# Patient Record
Sex: FEMALE | Race: WHITE | NOT HISPANIC OR LATINO | Employment: OTHER | URBAN - METROPOLITAN AREA
[De-identification: names, ages, dates, MRNs, and addresses within clinical notes are randomized per-mention and may not be internally consistent; named-entity substitution may affect disease eponyms.]

---

## 2024-04-02 ENCOUNTER — TRANSFERRED RECORDS (OUTPATIENT)
Dept: HEALTH INFORMATION MANAGEMENT | Facility: CLINIC | Age: 74
End: 2024-04-02

## 2024-06-05 ENCOUNTER — APPOINTMENT (OUTPATIENT)
Dept: RADIOLOGY | Facility: CLINIC | Age: 74
End: 2024-06-05
Attending: EMERGENCY MEDICINE
Payer: COMMERCIAL

## 2024-06-05 ENCOUNTER — HOSPITAL ENCOUNTER (EMERGENCY)
Facility: CLINIC | Age: 74
Discharge: HOME | End: 2024-06-05
Attending: EMERGENCY MEDICINE | Admitting: EMERGENCY MEDICINE
Payer: COMMERCIAL

## 2024-06-05 ENCOUNTER — APPOINTMENT (OUTPATIENT)
Dept: CT IMAGING | Facility: CLINIC | Age: 74
End: 2024-06-05
Attending: EMERGENCY MEDICINE
Payer: COMMERCIAL

## 2024-06-05 VITALS
OXYGEN SATURATION: 95 % | SYSTOLIC BLOOD PRESSURE: 163 MMHG | HEIGHT: 65 IN | RESPIRATION RATE: 18 BRPM | TEMPERATURE: 98.3 F | BODY MASS INDEX: 16.5 KG/M2 | WEIGHT: 99 LBS | DIASTOLIC BLOOD PRESSURE: 65 MMHG | HEART RATE: 52 BPM

## 2024-06-05 DIAGNOSIS — R07.89 ATYPICAL CHEST PAIN: ICD-10-CM

## 2024-06-05 LAB
ANION GAP SERPL CALCULATED.3IONS-SCNC: 9 MMOL/L (ref 7–15)
ATRIAL RATE - MUSE: 54 BPM
BASE EXCESS BLDV CALC-SCNC: 8.2 MMOL/L (ref -3–3)
BASOPHILS # BLD AUTO: 0 10E3/UL (ref 0–0.2)
BASOPHILS NFR BLD AUTO: 1 %
BUN SERPL-MCNC: 4.3 MG/DL (ref 8–23)
CALCIUM SERPL-MCNC: 9.6 MG/DL (ref 8.8–10.2)
CHLORIDE SERPL-SCNC: 104 MMOL/L (ref 98–107)
CREAT SERPL-MCNC: 0.58 MG/DL (ref 0.51–0.95)
DEPRECATED HCO3 PLAS-SCNC: 28 MMOL/L (ref 22–29)
DIASTOLIC BLOOD PRESSURE - MUSE: NORMAL MMHG
EGFRCR SERPLBLD CKD-EPI 2021: >90 ML/MIN/1.73M2
EOSINOPHIL # BLD AUTO: 0 10E3/UL (ref 0–0.7)
EOSINOPHIL NFR BLD AUTO: 0 %
ERYTHROCYTE [DISTWIDTH] IN BLOOD BY AUTOMATED COUNT: 15.2 % (ref 10–15)
FLUAV RNA SPEC QL NAA+PROBE: NEGATIVE
FLUBV RNA RESP QL NAA+PROBE: NEGATIVE
GLUCOSE SERPL-MCNC: 89 MG/DL (ref 70–99)
HCO3 BLDV-SCNC: 31 MMOL/L (ref 21–28)
HCT VFR BLD AUTO: 40 % (ref 35–47)
HGB BLD-MCNC: 13.4 G/DL (ref 11.7–15.7)
IMM GRANULOCYTES # BLD: 0 10E3/UL
IMM GRANULOCYTES NFR BLD: 0 %
INTERPRETATION ECG - MUSE: NORMAL
LYMPHOCYTES # BLD AUTO: 2.4 10E3/UL (ref 0.8–5.3)
LYMPHOCYTES NFR BLD AUTO: 35 %
MCH RBC QN AUTO: 30.1 PG (ref 26.5–33)
MCHC RBC AUTO-ENTMCNC: 33.5 G/DL (ref 31.5–36.5)
MCV RBC AUTO: 90 FL (ref 78–100)
MONOCYTES # BLD AUTO: 0.3 10E3/UL (ref 0–1.3)
MONOCYTES NFR BLD AUTO: 5 %
NEUTROPHILS # BLD AUTO: 4 10E3/UL (ref 1.6–8.3)
NEUTROPHILS NFR BLD AUTO: 59 %
NRBC # BLD AUTO: 0 10E3/UL
NRBC BLD AUTO-RTO: 0 /100
NT-PROBNP SERPL-MCNC: 375 PG/ML (ref 0–900)
O2/TOTAL GAS SETTING VFR VENT: 21 %
OXYHGB MFR BLDV: 27 % (ref 70–75)
P AXIS - MUSE: 64 DEGREES
PCO2 BLDV: 40 MM HG (ref 40–50)
PH BLDV: 7.5 [PH] (ref 7.32–7.43)
PLATELET # BLD AUTO: 286 10E3/UL (ref 150–450)
PO2 BLDV: 15 MM HG (ref 25–47)
POTASSIUM SERPL-SCNC: 3.3 MMOL/L (ref 3.4–5.3)
PR INTERVAL - MUSE: 102 MS
QRS DURATION - MUSE: 74 MS
QT - MUSE: 508 MS
QTC - MUSE: 481 MS
R AXIS - MUSE: 64 DEGREES
RBC # BLD AUTO: 4.45 10E6/UL (ref 3.8–5.2)
RSV RNA SPEC NAA+PROBE: NEGATIVE
SAO2 % BLDV: 29.8 % (ref 70–75)
SARS-COV-2 RNA RESP QL NAA+PROBE: NEGATIVE
SODIUM SERPL-SCNC: 141 MMOL/L (ref 135–145)
SYSTOLIC BLOOD PRESSURE - MUSE: NORMAL MMHG
T AXIS - MUSE: 50 DEGREES
T4 FREE SERPL-MCNC: 1.12 NG/DL (ref 0.9–1.7)
TROPONIN T SERPL HS-MCNC: 8 NG/L
TSH SERPL DL<=0.005 MIU/L-ACNC: 4.8 UIU/ML (ref 0.3–4.2)
VENTRICULAR RATE- MUSE: 54 BPM
WBC # BLD AUTO: 6.8 10E3/UL (ref 4–11)

## 2024-06-05 PROCEDURE — 84443 ASSAY THYROID STIM HORMONE: CPT | Performed by: EMERGENCY MEDICINE

## 2024-06-05 PROCEDURE — 94640 AIRWAY INHALATION TREATMENT: CPT

## 2024-06-05 PROCEDURE — 71045 X-RAY EXAM CHEST 1 VIEW: CPT

## 2024-06-05 PROCEDURE — 250N000009 HC RX 250: Performed by: EMERGENCY MEDICINE

## 2024-06-05 PROCEDURE — 83880 ASSAY OF NATRIURETIC PEPTIDE: CPT | Performed by: EMERGENCY MEDICINE

## 2024-06-05 PROCEDURE — 87637 SARSCOV2&INF A&B&RSV AMP PRB: CPT | Performed by: EMERGENCY MEDICINE

## 2024-06-05 PROCEDURE — 36415 COLL VENOUS BLD VENIPUNCTURE: CPT | Performed by: EMERGENCY MEDICINE

## 2024-06-05 PROCEDURE — 85025 COMPLETE CBC W/AUTO DIFF WBC: CPT | Performed by: EMERGENCY MEDICINE

## 2024-06-05 PROCEDURE — 99285 EMERGENCY DEPT VISIT HI MDM: CPT | Mod: 25

## 2024-06-05 PROCEDURE — 93005 ELECTROCARDIOGRAM TRACING: CPT | Performed by: EMERGENCY MEDICINE

## 2024-06-05 PROCEDURE — 74177 CT ABD & PELVIS W/CONTRAST: CPT

## 2024-06-05 PROCEDURE — 82374 ASSAY BLOOD CARBON DIOXIDE: CPT | Performed by: EMERGENCY MEDICINE

## 2024-06-05 PROCEDURE — 250N000011 HC RX IP 250 OP 636: Performed by: EMERGENCY MEDICINE

## 2024-06-05 PROCEDURE — 250N000013 HC RX MED GY IP 250 OP 250 PS 637: Performed by: EMERGENCY MEDICINE

## 2024-06-05 PROCEDURE — 84484 ASSAY OF TROPONIN QUANT: CPT | Performed by: EMERGENCY MEDICINE

## 2024-06-05 PROCEDURE — 84439 ASSAY OF FREE THYROXINE: CPT | Performed by: EMERGENCY MEDICINE

## 2024-06-05 PROCEDURE — 82805 BLOOD GASES W/O2 SATURATION: CPT | Performed by: EMERGENCY MEDICINE

## 2024-06-05 PROCEDURE — 999N000157 HC STATISTIC RCP TIME EA 10 MIN

## 2024-06-05 RX ORDER — ZINC GLUCONATE 50 MG
50 TABLET ORAL DAILY
COMMUNITY

## 2024-06-05 RX ORDER — AMLODIPINE BESYLATE 5 MG/1
5 TABLET ORAL DAILY
COMMUNITY
Start: 2024-05-06 | End: 2025-05-06

## 2024-06-05 RX ORDER — LOSARTAN POTASSIUM 25 MG/1
1 TABLET ORAL DAILY
COMMUNITY
Start: 2024-03-11

## 2024-06-05 RX ORDER — IPRATROPIUM BROMIDE AND ALBUTEROL SULFATE 2.5; .5 MG/3ML; MG/3ML
3 SOLUTION RESPIRATORY (INHALATION) ONCE
Status: COMPLETED | OUTPATIENT
Start: 2024-06-05 | End: 2024-06-05

## 2024-06-05 RX ORDER — OXYCODONE HYDROCHLORIDE 5 MG/1
5 TABLET ORAL EVERY 8 HOURS PRN
COMMUNITY
Start: 2024-05-08

## 2024-06-05 RX ORDER — FOLIC ACID 1 MG/1
1 TABLET ORAL DAILY
COMMUNITY
Start: 2024-04-03 | End: 2025-04-03

## 2024-06-05 RX ORDER — OXYCODONE HYDROCHLORIDE 5 MG/1
5 TABLET ORAL ONCE
Status: COMPLETED | OUTPATIENT
Start: 2024-06-05 | End: 2024-06-05

## 2024-06-05 RX ORDER — MULTIVIT-MIN/IRON/FOLIC ACID/K 18-600-40
6000 CAPSULE ORAL DAILY
COMMUNITY

## 2024-06-05 RX ORDER — IOPAMIDOL 755 MG/ML
90 INJECTION, SOLUTION INTRAVASCULAR ONCE
Status: COMPLETED | OUTPATIENT
Start: 2024-06-05 | End: 2024-06-05

## 2024-06-05 RX ORDER — TRAZODONE HYDROCHLORIDE 50 MG/1
1-2 TABLET, FILM COATED ORAL AT BEDTIME
COMMUNITY
Start: 2024-04-03

## 2024-06-05 RX ADMIN — IOPAMIDOL 90 ML: 755 INJECTION, SOLUTION INTRAVENOUS at 16:24

## 2024-06-05 RX ADMIN — IPRATROPIUM BROMIDE AND ALBUTEROL SULFATE 3 ML: .5; 3 SOLUTION RESPIRATORY (INHALATION) at 15:07

## 2024-06-05 RX ADMIN — OXYCODONE 5 MG: 5 TABLET ORAL at 17:18

## 2024-06-05 ASSESSMENT — ACTIVITIES OF DAILY LIVING (ADL)
ADLS_ACUITY_SCORE: 35

## 2024-06-05 ASSESSMENT — COLUMBIA-SUICIDE SEVERITY RATING SCALE - C-SSRS
2. HAVE YOU ACTUALLY HAD ANY THOUGHTS OF KILLING YOURSELF IN THE PAST MONTH?: NO
6. HAVE YOU EVER DONE ANYTHING, STARTED TO DO ANYTHING, OR PREPARED TO DO ANYTHING TO END YOUR LIFE?: NO
1. IN THE PAST MONTH, HAVE YOU WISHED YOU WERE DEAD OR WISHED YOU COULD GO TO SLEEP AND NOT WAKE UP?: NO

## 2024-06-05 NOTE — ED TRIAGE NOTES
Pt arrives via EMS with cp and sob. Started last night around 0200. Hx of COPD. Denies using oxygen at home. Smells heavily of cigarette smoke. Reports cutting down to a pack a day recently.      Triage Assessment (Adult)       Row Name 06/05/24 1346          Triage Assessment    Airway WDL WDL        Respiratory WDL    Respiratory WDL rhythm/pattern     Rhythm/Pattern, Respiratory dyspnea upon exertion        Skin Circulation/Temperature WDL    Skin Circulation/Temperature WDL WDL        Cardiac WDL    Cardiac WDL chest pain        Chest Pain Assessment    Chest Pain Location epigastric     Alleviating Factors sitting up     Chest Pain Intervention 12-lead ECG obtained        Peripheral/Neurovascular WDL    Peripheral Neurovascular WDL WDL        Cognitive/Neuro/Behavioral WDL    Cognitive/Neuro/Behavioral WDL WDL

## 2024-06-05 NOTE — MEDICATION SCRIBE - ADMISSION MEDICATION HISTORY
Medication Scribe Admission Medication History    Admission medication history is complete. The information provided in this note is only as accurate as the sources available at the time of the update.    Information Source(s): Patient via in-person    Pertinent Information: PDMP reports oxycodone 5 mg was picked up on 5/8, #65 (22 day supply). Patient states no medication has been taken today, had everything yesterday at noon. Last oxycodone yesterday morning per pt.     Changes made to PTA medication list:  Added: Added all medications seen below. No meds listed PTA.  Deleted: None  Changed: None    Allergies reviewed with patient and updates made in EHR: yes    Medication History Completed By: Aryan Enriquez 6/5/2024 4:53 PM    PTA Med List   Medication Sig Last Dose    amLODIPine (NORVASC) 5 MG tablet Take 5 mg by mouth daily 6/4/2024    ascorbic acid (VITAMIN C) 500 MG CPCR CR capsule Take 500 mg by mouth daily 6/4/2024 at 1200    Cholecalciferol (VITAMIN D) 50 MCG (2000 UT) CAPS Take 6,000 Units by mouth daily 6/4/2024 at 1200    folic acid (FOLVITE) 1 MG tablet Take 1 tablet by mouth daily 6/4/2024 at 1200    losartan (COZAAR) 25 MG tablet Take 1 tablet by mouth daily 6/4/2024 at 1200    oxyCODONE (ROXICODONE) 5 MG tablet Take 5 mg by mouth every 8 hours as needed 6/4/2024 at AM, none today    traZODone (DESYREL) 50 MG tablet Take 1-2 tablets by mouth at bedtime 6/4/2024 at HS 75 mg    zinc gluconate 50 MG tablet Take 50 mg by mouth daily 6/4/2024 at 1200

## 2024-06-05 NOTE — ED PROVIDER NOTES
EMERGENCY DEPARTMENT ENCOUNTER      NAME: Cristel KELLY Head  AGE: 74 year old female  YOB: 1950  MRN: [id number]  EVALUATION DATE & TIME: 6/5/2024  1:49 PM    PCP: Tamika Manuel    ED PROVIDER: Balaji Liao M.D.      Chief Complaint   Patient presents with    Shortness of Breath    Chest Pain         FINAL IMPRESSION:  1. Atypical chest pain          ED COURSE & MEDICAL DECISION MAKING:    Pertinent Labs & Imaging studies reviewed. (See chart for details)  74 year old female presents to the Emergency Department for evaluation of chest pain, shortness of breath. Patient appears non toxic with stable vitals signs, patient is afebrile with no tachycardia or hypoxia.  Lung sounds are clear with no marked increased work of breathing.  Certainly concern for COPD exacerbation, her story is atypical for ACS, less likely pneumonia, no fever or bodyaches to suggest COVID or influenza.  Considered but lower suspicion for pneumothorax or rib fracture.  No tachycardia, pleurisy, hypoxia, nothing just PE or dissection.  Per review of the medical record, did review office visit through [facility] internal medicine on 5/13/2024 patient noted to have a history of severe hypokalemia, is on levothyroxine.  Considered but less likely endocrine dysfunction or Theresa abnormality.  Considered CT imaging of the chest but again no tachycardia or pleurisy to suggest PE.  We will obtain screening labs, ECG, troponin, blood gas and a chest x-ray.  Patient was given DuoNeb.      2:20 PM Met with and introduced myself to the patient. Discussed history and plan of care.   4:00 PM repeat exam is benign, patient states that she feels much improved.  5:23 PM repeat exam is benign, discussed findings and discharge close follow-up.    Reassessment: Labs by my independent interpretation showed no signs of acute kidney injury with a creatinine of 0.58, no signs of anemia with a hemoglobin of 13.4.  Troponin of 8 given  duration of symptoms certainly nothing suggest cardiac ischemia, ECG nonischemic.  Patient had some mild bradycardia here and did review office visit from [facility] on 5/13/2024 where pulse again noted at 56 which is essentially where she is at here today, at that encounter there was discussion for continued Synthroid, certainly her endocrine dysfunction may be related to her mild bradycardia, certainly here nothing to suggest myxedema coma or other more malicious or severe endocrine dysfunction manifestations.  On my repeat exam heart rate is in the mid 60s, will recommend that she continue her outpatient Synthroid.  Venous pH is 7.50 with certainly nothing just respiratory acidosis or acute COPD exacerbation.  TSH of 4.80.  On my repeat exam patient states that she no longer has any chest pain whatsoever, COVID influenza RSV negative, chest x-ray reported no acute concerning findings.  On my exam patient did have some vague abdominal pains we did obtain CT imaging the abdomen pelvis which reported this nonspecific asymmetric right urothelial enhancement, could represent a sending urinary tract infection however the patient has no urinary symptoms whatsoever, no flank pain, normal white blood cell count, certain clinically nothing to suggest UTI or pyelonephritis, offered urine studies here but patient deferred which I felt was reasonable.  Considered admission for continued observation and serial troponins but with negative workup and well appearance and resolution of symptoms so she is safe for discharge and close follow-up.  Will discharge with instructions to have her follow-up with primary care to discuss her Synthroid and slightly elevated TSH, potassium of 3.3.  Again ECG showed no acute concerning findings.  Will have her follow-up with rapid access clinic in the next 1 or 2 days.  Discussed all these findings recommendations with the patient felt reassured and comfortable discharge.  Return precaution  provided.    Medical Decision Making  Obtained supplemental history:Supplemental history obtained?: Documented in chart  Reviewed external records: External records reviewed?: Documented in chart  Care impacted by chronic illness:Other: COPD  Care significantly affected by social determinants of health:N/A  Did you consider but not order tests?: Work up considered but not performed and documented in chart, if applicable  Did you interpret images independently?: Independent interpretation of ECG and images noted in documentation, when applicable.  Consultation discussion with other provider:Did you involve another provider (consultant, , pharmacy, etc.)?: No  Discharge. I recommended the patient continue their current prescription strength medication(s): Synthroid. I considered admission, but discharged patient after significant clinical improvement.      At the conclusion of the encounter I discussed the results of all of the tests and the disposition. The questions were answered and return precautions provided. The patient or family acknowledged understanding and was agreeable with the care plan.         MEDICATIONS GIVEN IN THE EMERGENCY:  Medications   ipratropium - albuterol 0.5 mg/2.5 mg/3 mL (DUONEB) neb solution 3 mL (3 mLs Nebulization $Given 6/5/24 1507)   iopamidol (ISOVUE-370) solution 90 mL (90 mLs Intravenous $Given 6/5/24 1624)   oxyCODONE (ROXICODONE) tablet 5 mg (5 mg Oral $Given 6/5/24 1718)       NEW PRESCRIPTIONS STARTED AT TODAY'S ER VISIT  Discharge Medication List as of 6/5/2024  5:33 PM               =================================================================    HPI    Patient information was obtained from: Patient    Use of Intrepreter: N/A       Cristel Barros is a 74 year old female who presents to the ED by EMS for evaluation of shortness of breath and chest pain.     The patient reports at 2 AM this morning, she endorsed left-sided chest pain that felt sharp along with shortness of  breath.There was nothing that made it better or worse and she has never felt like this before. She does have a history of COPD, but denies using oxygen at home. The chest pain did subside, but came back this afternoon more intensely and she had vomited. The patient called her PCP and they recommended her to go to the ED for further evaluation.     Additionally, on exam the patient endorsed LLQ abdominal pain.     Denies fever, urinary symptoms, blood stool, and black stool.       REVIEW OF SYSTEMS   Constitutional:  Denies fever, chills   Respiratory:  Denies productive cough or increased work of breathing  Cardiovascular:  Denies palpitations  GI:  Denies nausea or change in bowel or bladder habits   Musculoskeletal:  Denies any new muscle/joint swelling  Skin:  Denies rash   Neurologic:  Denies focal weakness  All systems negative except as marked.     PAST MEDICAL HISTORY:  History reviewed. No pertinent past medical history.    PAST SURGICAL HISTORY:  History reviewed. No pertinent surgical history.      CURRENT MEDICATIONS:    Prior to Admission medications    Not on File        ALLERGIES:  No Known Allergies    FAMILY HISTORY:  History reviewed. No pertinent family history.    SOCIAL HISTORY:   Social History     Socioeconomic History    Marital status: Single       VITALS:  Patient Vitals for the past 24 hrs:   BP Temp Pulse Resp SpO2 Height Weight   06/05/24 1736 -- -- 52 18 95 % -- --   06/05/24 1731 (!) 163/65 -- 55 -- -- -- --   06/05/24 1700 (!) 140/63 -- 61 -- 94 % -- --   06/05/24 1645 (!) 150/66 -- 63 17 94 % -- --   06/05/24 1600 136/64 -- 63 -- 92 % -- --   06/05/24 1546 (!) 147/77 -- 62 -- 91 % -- --   06/05/24 1516 (!) 145/64 -- (!) 49 -- 100 % -- --   06/05/24 1507 -- -- (!) 47 29 97 % -- --   06/05/24 1500 (!) 151/61 -- (!) 47 -- 96 % -- --   06/05/24 1446 (!) 161/71 -- (!) 46 -- 95 % -- --   06/05/24 1415 130/58 -- 52 -- 95 % -- --   06/05/24 1400 (!) 147/67 -- 50 19 95 % -- --   06/05/24 1350  "(!) 153/68 -- 52 -- 98 % -- --   06/05/24 1348 -- 98.3  F (36.8  C) -- -- -- -- --   06/05/24 1345 (!) 162/72 -- 54 22 97 % 1.651 m (5' 5\") 44.9 kg (99 lb)        PHYSICAL EXAM    Constitutional:  Awake, alert, in no apparent distress   HENT:  Normocephalic, Atraumatic. Bilateral external ears normal. Oropharynx moist. Nose normal. Neck- Normal range of motion with no guarding, No midline cervical tenderness, Supple, No stridor.   Eyes:  PERRL, EOMI with no signs of entrapment, Conjunctiva normal, No discharge.   Respiratory:  Normal breath sounds, No respiratory distress, No wheezing.    Cardiovascular: Bradycardia, Normal rhythm, No appreciable rubs or gallops.   GI:  Soft, No tenderness, No distension, No palpable masses   Musculoskeletal:  Intact distal pulses, No edema. Good range of motion in all major joints. No tenderness to palpation or major deformities noted.   Integument:  Warm, Dry, No erythema, No rash.   Neurologic:  Alert & oriented, Normal motor function, Normal sensory function, No focal deficits noted.   Psychiatric:  Affect normal, Judgment normal, Mood normal.      LAB:  All pertinent labs reviewed and interpreted.  Results for orders placed or performed during the hospital encounter of 06/05/24   XR Chest Port 1 View    Impression    IMPRESSION: Calcified granulomata right lower lobe. Heart size and vascularity are normal. No focal consolidation, pneumothorax nor pleural effusion. Left shoulder arthroplasty.   CT Abdomen Pelvis w Contrast    Impression    IMPRESSION:   1.  Slight asymmetric right urothelial enhancement, which could represent ascending urinary tract infection. No collecting system dilation or urinary tract calculi.    2.  Small volume ascites in the pelvis. No evidence of hemoperitoneum.    3.  Unilocular simple cyst in the left ovary measuring 5.1 cm. Recommend nonemergent pelvic ultrasound for further evaluation.    REFERENCE:  Management of Incidental Adnexal Findings on CT " and MRI: A White Paper of the ACR Incidental Findings Committee. J Am Hebert Radiol 2020; 17(2):248-254.    Postmenopausal or equal to or >50 years if status unknown:    >3 cm on CT: Ultrasound.       Basic metabolic panel   Result Value Ref Range    Sodium 141 135 - 145 mmol/L    Potassium 3.3 (L) 3.4 - 5.3 mmol/L    Chloride 104 98 - 107 mmol/L    Carbon Dioxide (CO2) 28 22 - 29 mmol/L    Anion Gap 9 7 - 15 mmol/L    Urea Nitrogen 4.3 (L) 8.0 - 23.0 mg/dL    Creatinine 0.58 0.51 - 0.95 mg/dL    GFR Estimate >90 >60 mL/min/1.73m2    Calcium 9.6 8.8 - 10.2 mg/dL    Glucose 89 70 - 99 mg/dL   Symptomatic Influenza A/B, RSV, & SARS-CoV2 PCR (COVID-19) Nasopharyngeal    Specimen: Nasopharyngeal; Swab   Result Value Ref Range    Influenza A PCR Negative Negative    Influenza B PCR Negative Negative    RSV PCR Negative Negative    SARS CoV2 PCR Negative Negative   Blood gas venous   Result Value Ref Range    pH Venous 7.50 (H) 7.32 - 7.43    pCO2 Venous 40 40 - 50 mm Hg    pO2 Venous 15 (L) 25 - 47 mm Hg    Bicarbonate Venous 31 (H) 21 - 28 mmol/L    Base Excess/Deficit Venous 8.2 (H) -3.0 - 3.0 mmol/L    FIO2 21     Oxyhemoglobin Venous 27 (L) 70 - 75 %    O2 Sat, Venous 29.8 (L) 70.0 - 75.0 %   Troponin T, High Sensitivity (now)   Result Value Ref Range    Troponin T, High Sensitivity 8 <=14 ng/L   N terminal pro BNP outpatient   Result Value Ref Range    N Terminal Pro BNP Outpatient 375 0 - 900 pg/mL   CBC with platelets and differential   Result Value Ref Range    WBC Count 6.8 4.0 - 11.0 10e3/uL    RBC Count 4.45 3.80 - 5.20 10e6/uL    Hemoglobin 13.4 11.7 - 15.7 g/dL    Hematocrit 40.0 35.0 - 47.0 %    MCV 90 78 - 100 fL    MCH 30.1 26.5 - 33.0 pg    MCHC 33.5 31.5 - 36.5 g/dL    RDW 15.2 (H) 10.0 - 15.0 %    Platelet Count 286 150 - 450 10e3/uL    % Neutrophils 59 %    % Lymphocytes 35 %    % Monocytes 5 %    % Eosinophils 0 %    % Basophils 1 %    % Immature Granulocytes 0 %    NRBCs per 100 WBC 0 <1 /100     Absolute Neutrophils 4.0 1.6 - 8.3 10e3/uL    Absolute Lymphocytes 2.4 0.8 - 5.3 10e3/uL    Absolute Monocytes 0.3 0.0 - 1.3 10e3/uL    Absolute Eosinophils 0.0 0.0 - 0.7 10e3/uL    Absolute Basophils 0.0 0.0 - 0.2 10e3/uL    Absolute Immature Granulocytes 0.0 <=0.4 10e3/uL    Absolute NRBCs 0.0 10e3/uL   TSH with free T4 reflex   Result Value Ref Range    TSH 4.80 (H) 0.30 - 4.20 uIU/mL   Result Value Ref Range    Free T4 1.12 0.90 - 1.70 ng/dL   ECG 12-LEAD WITH MUSE (LHE)   Result Value Ref Range    Systolic Blood Pressure  mmHg    Diastolic Blood Pressure  mmHg    Ventricular Rate 54 BPM    Atrial Rate 54 BPM    CO Interval 102 ms    QRS Duration 74 ms     ms    QTc 481 ms    P Axis 64 degrees    R AXIS 64 degrees    T Axis 50 degrees    Interpretation ECG       Sinus bradycardia with short CO  Otherwise normal ECG  No previous ECGs available  Confirmed by SEE ED PROVIDER NOTE FOR, ECG INTERPRETATION ([id number]),  SHAHNAZ REYES ([id number]) on 6/5/2024 2:56:11 PM         RADIOLOGY:  CT Abdomen Pelvis w Contrast   Final Result   IMPRESSION:    1.  Slight asymmetric right urothelial enhancement, which could represent ascending urinary tract infection. No collecting system dilation or urinary tract calculi.      2.  Small volume ascites in the pelvis. No evidence of hemoperitoneum.      3.  Unilocular simple cyst in the left ovary measuring 5.1 cm. Recommend nonemergent pelvic ultrasound for further evaluation.      REFERENCE:   Management of Incidental Adnexal Findings on CT and MRI: A White Paper of the ACR Incidental Findings Committee. J Am Hebert Radiol 2020; 17(2):248-254.      Postmenopausal or equal to or >50 years if status unknown:      >3 cm on CT: Ultrasound.            XR Chest Port 1 View   Final Result   IMPRESSION: Calcified granulomata right lower lobe. Heart size and vascularity are normal. No focal consolidation, pneumothorax nor pleural effusion. Left shoulder arthroplasty.             EKG:     Sinus bradycardia, no specific ST acute ischemic changes, no concerning dysrhythmias or for prolongation, no priors for comparison  I have independently reviewed and interpreted the EKG(s) documented above.    PROCEDURES:        [employer] System Documentation:       I, Mona Barragan, am serving as a scribe to document services personally performed by Balaji Liao MD, based on my observation and the provider's statements to me. I, Balaji Liao MD attest that Mona Barragan is acting in a scribe capacity, has observed my performance of the services and has documented them in accordance with my direction.    Balaji Liao M.D.  Emergency Medicine  [facility] EMERGENCY ROOM  [address] [id number]  [phone]  Dept: [phone]       Balaji Liao MD  06/05/24 3031

## 2024-06-20 ENCOUNTER — OFFICE VISIT (OUTPATIENT)
Dept: CARDIOLOGY | Facility: CLINIC | Age: 74
End: 2024-06-20
Attending: EMERGENCY MEDICINE
Payer: COMMERCIAL

## 2024-06-20 VITALS
HEIGHT: 65 IN | BODY MASS INDEX: 16.23 KG/M2 | HEART RATE: 64 BPM | DIASTOLIC BLOOD PRESSURE: 60 MMHG | WEIGHT: 97.4 LBS | SYSTOLIC BLOOD PRESSURE: 138 MMHG | RESPIRATION RATE: 20 BRPM

## 2024-06-20 DIAGNOSIS — I10 ESSENTIAL HYPERTENSION: ICD-10-CM

## 2024-06-20 DIAGNOSIS — R63.4 RECENT UNEXPLAINED WEIGHT LOSS: ICD-10-CM

## 2024-06-20 DIAGNOSIS — R07.89 CHEST DISCOMFORT: Primary | ICD-10-CM

## 2024-06-20 DIAGNOSIS — E78.00 HYPERCHOLESTEROLEMIA: ICD-10-CM

## 2024-06-20 PROCEDURE — 99203 OFFICE O/P NEW LOW 30 MIN: CPT | Performed by: INTERNAL MEDICINE

## 2024-06-20 NOTE — PROGRESS NOTES
Thank you, Dr. Balaji Liao, for asking the [facility] team to see Ms. Cristel Barros to chest and back discomfort.    Assessment/Recommendations   Assessment:    1.  Left lateral chest and interscapular pain, etiology unclear as workup in the ED was unrevealing.  She does report similar symptoms when she walks the hallway of her apartment building which resolve with rest suggesting it may be anginal in nature.  She does have risk factors of mild hyperlipidemia and tobacco use and have recommended a nuclear stress test for further evaluation.  2.  Mild hypercholesterolemia, currently untreated.  Clearly if we find evidence of coronary artery disease, we will want to initiate statin therapy.  3.  Essential hypertension, controlled  4.  Weight loss, etiology unclear.  She reports ongoing weight loss over the past 1 to 1.5 years despite eating adequate amounts of food.  Suggested she follow-up with primary care regarding this.    Plan:  1.  Continue current medications  2.  Pursue nuclear stress test with further recommendations to follow       History of Present Illness    Ms. Cristel Barros is a 74 year old female with longstanding history of tobacco use, mild hypercholesterolemia and family history of CAD and a sister who sustained an MI in her 60s presents today following a visit to the ED for evaluation of chest discomfort.  States she had an episode of discomfort which she describes more as sharp under her left breast as well as between her shoulder blades.  Denied any radiation into the arms or jaw.  No associated shortness of breath or diaphoresis.  ECG in the ED demonstrated no acute ischemic changes and a troponin level was within normal limits.  Was subsequently discharged home and here today for further evaluation.    ECG (personally reviewed): ECG from the ED demonstrated sinus bradycardia.  No acute ischemic changes.    Cardiac Imaging Studies (personally reviewed): No prior cardiac  "imaging     Physical Examination Review of Systems   /60 (BP Location: Left arm, Patient Position: Sitting, Cuff Size: Adult Small)   Pulse 64   Resp 20   Ht 1.651 m (5' 5\")   Wt 44.2 kg (97 lb 6.4 oz)   BMI 16.21 kg/m    Body mass index is 16.21 kg/m .  Wt Readings from Last 3 Encounters:   06/20/24 44.2 kg (97 lb 6.4 oz)   06/05/24 44.9 kg (99 lb)     General Appearance:   Awake, Alert, No acute distress.   HEENT:  No scleral icterus; the mucous membranes were pink and moist.   Neck: No cervical bruits or jugular venous distention    Chest: The spine was straight. The chest was symmetric.   Lungs:   Respirations unlabored; the lungs are clear to auscultation. No wheezing   Cardiovascular:   Regular rate and rhythm.  S1, S2 normal.  No murmur or gallop   Abdomen:  No organomegaly, masses, bruits, or tenderness. Bowels sounds are present   Extremities: No peripheral edema   Skin: No xanthelasma. Warm, Dry.   Musculoskeletal: No tenderness.   Neurologic: Mood and affect are appropriate.    Enc Vitals  BP: 138/60  Pulse: 64  Resp: 20  Weight: 44.2 kg (97 lb 6.4 oz)  Height: 165.1 cm (5' 5\")                                         Medical History  Surgical History Family History Social History   Past Medical History:   Diagnosis Date    Hypertension     History reviewed. No pertinent surgical history. Family History   Problem Relation Age of Onset    Colon Cancer Mother     Coronary Artery Disease Father 72        MI    Diabetes Brother     Coronary Artery Disease Sister 64        MI    Social History     Socioeconomic History    Marital status: Single     Spouse name: Not on file    Number of children: Not on file    Years of education: Not on file    Highest education level: Not on file   Occupational History    Not on file   Tobacco Use    Smoking status: Every Day     Current packs/day: 1.00     Average packs/day: 1 pack/day for 59.5 years (59.5 ttl pk-yrs)     Types: Cigarettes     Start date: 1965    " Smokeless tobacco: Never   Vaping Use    Vaping status: Never Used   Substance and Sexual Activity    Alcohol use: Not on file    Drug use: Not on file    Sexual activity: Not on file   Other Topics Concern    Not on file   Social History Narrative    Not on file     Social Determinants of Health     Financial Resource Strain: Not on file   Food Insecurity: Not on file   Transportation Needs: Not on file   Physical Activity: Not on file   Stress: Not on file   Social Connections: Not on file   Interpersonal Safety: Not on file   Housing Stability: Not on file          Medications  Allergies   Current Outpatient Medications   Medication Sig Dispense Refill    amLODIPine (NORVASC) 5 MG tablet Take 5 mg by mouth daily      ascorbic acid (VITAMIN C) 500 MG CPCR CR capsule Take 500 mg by mouth daily      Cholecalciferol (VITAMIN D) 50 MCG (2000 UT) CAPS Take 6,000 Units by mouth daily      folic acid (FOLVITE) 1 MG tablet Take 1 tablet by mouth daily      losartan (COZAAR) 25 MG tablet Take 1 tablet by mouth daily      oxyCODONE (ROXICODONE) 5 MG tablet Take 5 mg by mouth every 8 hours as needed      traZODone (DESYREL) 50 MG tablet Take 1-2 tablets by mouth at bedtime      zinc gluconate 50 MG tablet Take 50 mg by mouth daily        Allergies   Allergen Reactions    Corticosteroids      itching    Paroxetine      Likely cause of prolonged QTc up to 639    Quinolones Itching    Sulfa Antibiotics          Lab Results    Chemistry/lipid CBC Cardiac Enzymes/BNP/TSH/INR   Recent Labs   Lab Test 06/05/24  1436   BUN 4.3*      CO2 28    Recent Labs   Lab Test 06/05/24  1436   WBC 6.8   HGB 13.4   HCT 40.0   MCV 90       Recent Labs   Lab Test 06/05/24  1436   TSH 4.80*        A total of 38 minutes was spent reviewing patient's medical records, obtaining history and performing examination, as well as discussing diagnoses/ recommendations with patient and answering all questions.

## 2024-06-20 NOTE — LETTER
6/20/2024    Tamika Manuel, APRN, CNP  [address] [address]    RE: Cristel A Head       Dear Colleague,     I had the pleasure of seeing Cristel Barros in the [facility].      Thank you, Dr. Balaji Liao, for asking the [facility] team to see Ms. Cristel Barros to chest and back discomfort.    Assessment/Recommendations   Assessment:    1.  Left lateral chest and interscapular pain, etiology unclear as workup in the ED was unrevealing.  She does report similar symptoms when she walks the hallway of her apartment building which resolve with rest suggesting it may be anginal in nature.  She does have risk factors of mild hyperlipidemia and tobacco use and have recommended a nuclear stress test for further evaluation.  2.  Mild hypercholesterolemia, currently untreated.  Clearly if we find evidence of coronary artery disease, we will want to initiate statin therapy.  3.  Essential hypertension, controlled  4.  Weight loss, etiology unclear.  She reports ongoing weight loss over the past 1 to 1.5 years despite eating adequate amounts of food.  Suggested she follow-up with primary care regarding this.    Plan:  1.  Continue current medications  2.  Pursue nuclear stress test with further recommendations to follow       History of Present Illness    Ms. Cristel Barros is a 74 year old female with longstanding history of tobacco use, mild hypercholesterolemia and family history of CAD and a sister who sustained an MI in her 60s presents today following a visit to the ED for evaluation of chest discomfort.  States she had an episode of discomfort which she describes more as sharp under her left breast as well as between her shoulder blades.  Denied any radiation into the arms or jaw.  No associated shortness of breath or diaphoresis.  ECG in the ED demonstrated no acute ischemic changes and a troponin level was within normal limits.  Was subsequently discharged home and  "here today for further evaluation.    ECG (personally reviewed): ECG from the ED demonstrated sinus bradycardia.  No acute ischemic changes.    Cardiac Imaging Studies (personally reviewed): No prior cardiac imaging     Physical Examination Review of Systems   /60 (BP Location: Left arm, Patient Position: Sitting, Cuff Size: Adult Small)   Pulse 64   Resp 20   Ht 1.651 m (5' 5\")   Wt 44.2 kg (97 lb 6.4 oz)   BMI 16.21 kg/m    Body mass index is 16.21 kg/m .  Wt Readings from Last 3 Encounters:   06/20/24 44.2 kg (97 lb 6.4 oz)   06/05/24 44.9 kg (99 lb)     General Appearance:   Awake, Alert, No acute distress.   HEENT:  No scleral icterus; the mucous membranes were pink and moist.   Neck: No cervical bruits or jugular venous distention    Chest: The spine was straight. The chest was symmetric.   Lungs:   Respirations unlabored; the lungs are clear to auscultation. No wheezing   Cardiovascular:   Regular rate and rhythm.  S1, S2 normal.  No murmur or gallop   Abdomen:  No organomegaly, masses, bruits, or tenderness. Bowels sounds are present   Extremities: No peripheral edema   Skin: No xanthelasma. Warm, Dry.   Musculoskeletal: No tenderness.   Neurologic: Mood and affect are appropriate.    Enc Vitals  BP: 138/60  Pulse: 64  Resp: 20  Weight: 44.2 kg (97 lb 6.4 oz)  Height: 165.1 cm (5' 5\")                                         Medical History  Surgical History Family History Social History   Past Medical History:   Diagnosis Date    Hypertension     History reviewed. No pertinent surgical history. Family History   Problem Relation Age of Onset    Colon Cancer Mother     Coronary Artery Disease Father 72        MI    Diabetes Brother     Coronary Artery Disease Sister 64        MI    Social History     Socioeconomic History    Marital status: Single     Spouse name: Not on file    Number of children: Not on file    Years of education: Not on file    Highest education level: Not on file   Occupational " History    Not on file   Tobacco Use    Smoking status: Every Day     Current packs/day: 1.00     Average packs/day: 1 pack/day for 59.5 years (59.5 ttl pk-yrs)     Types: Cigarettes     Start date: 1965    Smokeless tobacco: Never   Vaping Use    Vaping status: Never Used   Substance and Sexual Activity    Alcohol use: Not on file    Drug use: Not on file    Sexual activity: Not on file   Other Topics Concern    Not on file   Social History Narrative    Not on file     Social Determinants of Health     Financial Resource Strain: Not on file   Food Insecurity: Not on file   Transportation Needs: Not on file   Physical Activity: Not on file   Stress: Not on file   Social Connections: Not on file   Interpersonal Safety: Not on file   Housing Stability: Not on file          Medications  Allergies   Current Outpatient Medications   Medication Sig Dispense Refill    amLODIPine (NORVASC) 5 MG tablet Take 5 mg by mouth daily      ascorbic acid (VITAMIN C) 500 MG CPCR CR capsule Take 500 mg by mouth daily      Cholecalciferol (VITAMIN D) 50 MCG (2000 UT) CAPS Take 6,000 Units by mouth daily      folic acid (FOLVITE) 1 MG tablet Take 1 tablet by mouth daily      losartan (COZAAR) 25 MG tablet Take 1 tablet by mouth daily      oxyCODONE (ROXICODONE) 5 MG tablet Take 5 mg by mouth every 8 hours as needed      traZODone (DESYREL) 50 MG tablet Take 1-2 tablets by mouth at bedtime      zinc gluconate 50 MG tablet Take 50 mg by mouth daily        Allergies   Allergen Reactions    Corticosteroids      itching    Paroxetine      Likely cause of prolonged QTc up to 639    Quinolones Itching    Sulfa Antibiotics          Lab Results    Chemistry/lipid CBC Cardiac Enzymes/BNP/TSH/INR   Recent Labs   Lab Test 06/05/24  1436   BUN 4.3*      CO2 28    Recent Labs   Lab Test 06/05/24  1436   WBC 6.8   HGB 13.4   HCT 40.0   MCV 90       Recent Labs   Lab Test 06/05/24  1436   TSH 4.80*        A total of 38 minutes was spent  reviewing patient's medical records, obtaining history and performing examination, as well as discussing diagnoses/ recommendations with patient and answering all questions.                    Thank you for allowing me to participate in the care of your patient.      Sincerely,   Adore Maynard MD   [facility]  cc:   Balaji Liao MD  [address] [place] [address]

## 2024-07-12 ENCOUNTER — TELEPHONE (OUTPATIENT)
Dept: CARDIOLOGY | Facility: CLINIC | Age: 74
End: 2024-07-12
Payer: COMMERCIAL